# Patient Record
Sex: MALE | Race: WHITE | URBAN - METROPOLITAN AREA
[De-identification: names, ages, dates, MRNs, and addresses within clinical notes are randomized per-mention and may not be internally consistent; named-entity substitution may affect disease eponyms.]

---

## 2017-03-09 ENCOUNTER — HISTORICAL (OUTPATIENT)
Dept: RADIOLOGY | Facility: HOSPITAL | Age: 67
End: 2017-03-09

## 2017-03-10 ENCOUNTER — HISTORICAL (OUTPATIENT)
Dept: RADIOLOGY | Facility: HOSPITAL | Age: 67
End: 2017-03-10

## 2018-05-14 ENCOUNTER — HISTORICAL (OUTPATIENT)
Dept: RADIOLOGY | Facility: HOSPITAL | Age: 68
End: 2018-05-14

## 2018-05-14 LAB — POC CREATININE: 0.8 MG/DL (ref 0.6–1.3)

## 2019-03-18 ENCOUNTER — HISTORICAL (OUTPATIENT)
Dept: LAB | Facility: HOSPITAL | Age: 69
End: 2019-03-18

## 2019-03-19 LAB — GRAM STN SPEC: NORMAL

## 2019-03-21 LAB — FINAL CULTURE: NORMAL

## 2019-04-15 LAB — FINAL CULTURE: NORMAL

## 2020-06-17 ENCOUNTER — HISTORICAL (OUTPATIENT)
Dept: RADIOLOGY | Facility: HOSPITAL | Age: 70
End: 2020-06-17

## 2020-07-29 ENCOUNTER — HISTORICAL (OUTPATIENT)
Dept: RADIOLOGY | Facility: HOSPITAL | Age: 70
End: 2020-07-29

## 2020-07-29 LAB — POC CREATININE: 0.8 MG/DL (ref 0.6–1.3)

## 2020-08-03 ENCOUNTER — HISTORICAL (OUTPATIENT)
Dept: RADIOLOGY | Facility: HOSPITAL | Age: 70
End: 2020-08-03

## 2020-09-15 ENCOUNTER — HISTORICAL (OUTPATIENT)
Dept: RADIOLOGY | Facility: HOSPITAL | Age: 70
End: 2020-09-15

## 2020-09-15 LAB — POC CREATININE: 0.8 MG/DL (ref 0.6–1.3)

## 2022-05-12 DIAGNOSIS — D32.0 BENIGN NEOPLASM OF CEREBRAL MENINGES: Primary | ICD-10-CM

## 2022-09-06 ENCOUNTER — TELEPHONE (OUTPATIENT)
Dept: NEUROSURGERY | Facility: CLINIC | Age: 72
End: 2022-09-06

## 2022-09-06 NOTE — TELEPHONE ENCOUNTER
[10:07 AM] Nori Hyaes,  I just spoke with Tobi Miller (MRN#[id number]). I noticed that his MRI wasn't scheduled yet, but you had tried contacting them.  The wife said she doesn't know how to check voicemails.  He does have a pacemaker and I know those can't be scheduled until you guys get with the rep first.  Can you call them back to get this started?  He has an appointment with Dr. Oviedo on 9/19/22 to follow up with the MRI.   [phone]    [10:09 AM] Anthony Poe  i will have one of my agents reach out to him    [10:10 AM] Nori Gutiérrez  Thank you ma'am    [10:10 AM] Anthony Poe  your welcome    [11:13 AM] Anthony Gutiérrez patient stated  [11:12 AM] Cristine Cuelloiss  I just got off the phone with pt's wife, she said that he doesn't want to do the MRI or the follow up appointment with Dr Oviedo at all and doesn't want to fight with him to get him anymore about it        **appointments have been cancelled at patients request**